# Patient Record
Sex: FEMALE
[De-identification: names, ages, dates, MRNs, and addresses within clinical notes are randomized per-mention and may not be internally consistent; named-entity substitution may affect disease eponyms.]

---

## 2019-02-27 ENCOUNTER — HOSPITAL ENCOUNTER (EMERGENCY)
Dept: HOSPITAL 88 - ER | Age: 64
Discharge: HOME | End: 2019-02-27
Payer: COMMERCIAL

## 2019-02-27 VITALS — DIASTOLIC BLOOD PRESSURE: 98 MMHG | SYSTOLIC BLOOD PRESSURE: 177 MMHG

## 2019-02-27 VITALS — BODY MASS INDEX: 35.08 KG/M2 | HEIGHT: 59 IN | WEIGHT: 174 LBS

## 2019-02-27 DIAGNOSIS — I10: ICD-10-CM

## 2019-02-27 DIAGNOSIS — E78.5: ICD-10-CM

## 2019-02-27 DIAGNOSIS — E11.9: ICD-10-CM

## 2019-02-27 DIAGNOSIS — R10.84: Primary | ICD-10-CM

## 2019-02-27 LAB
ALBUMIN SERPL-MCNC: 4.2 G/DL (ref 3.5–5)
ALBUMIN/GLOB SERPL: 1.6 {RATIO} (ref 0.8–2)
ALP SERPL-CCNC: 51 IU/L (ref 40–150)
ALT SERPL-CCNC: 23 IU/L (ref 0–55)
AMYLASE SERPL-CCNC: 62 U/L (ref 25–125)
ANION GAP SERPL CALC-SCNC: 12.6 MMOL/L (ref 8–16)
BACTERIA URNS QL MICRO: (no result) /HPF
BASOPHILS # BLD AUTO: 0 10*3/UL (ref 0–0.1)
BASOPHILS NFR BLD AUTO: 0.5 % (ref 0–1)
BILIRUB UR QL: NEGATIVE
BUN SERPL-MCNC: 21 MG/DL (ref 7–26)
BUN/CREAT SERPL: 29 (ref 6–25)
CALCIUM SERPL-MCNC: 9.8 MG/DL (ref 8.4–10.2)
CHLORIDE SERPL-SCNC: 104 MMOL/L (ref 98–107)
CLARITY UR: CLEAR
CO2 SERPL-SCNC: 23 MMOL/L (ref 22–29)
COLOR UR: YELLOW
DEPRECATED NEUTROPHILS # BLD AUTO: 3.8 10*3/UL (ref 2.1–6.9)
DEPRECATED RBC URNS MANUAL-ACNC: (no result) /HPF (ref 0–5)
EGFRCR SERPLBLD CKD-EPI 2021: > 60 ML/MIN (ref 60–?)
EOSINOPHIL # BLD AUTO: 0 10*3/UL (ref 0–0.4)
EOSINOPHIL NFR BLD AUTO: 0.2 % (ref 0–6)
EPI CELLS URNS QL MICRO: (no result) /LPF
ERYTHROCYTE [DISTWIDTH] IN CORD BLOOD: 12.4 % (ref 11.7–14.4)
GLOBULIN PLAS-MCNC: 2.7 G/DL (ref 2.3–3.5)
GLUCOSE SERPLBLD-MCNC: 149 MG/DL (ref 74–118)
HCT VFR BLD AUTO: 40 % (ref 34.2–44.1)
HGB BLD-MCNC: 13.7 G/DL (ref 12–16)
KETONES UR QL STRIP.AUTO: NEGATIVE
LEUKOCYTE ESTERASE UR QL STRIP.AUTO: NEGATIVE
LIPASE SERPL-CCNC: 44 U/L (ref 8–78)
LYMPHOCYTES # BLD: 1.8 10*3/UL (ref 1–3.2)
LYMPHOCYTES NFR BLD AUTO: 29.8 % (ref 18–39.1)
MCH RBC QN AUTO: 31.9 PG (ref 28–32)
MCHC RBC AUTO-ENTMCNC: 34.3 G/DL (ref 31–35)
MCV RBC AUTO: 93.2 FL (ref 81–99)
MONOCYTES # BLD AUTO: 0.4 10*3/UL (ref 0.2–0.8)
MONOCYTES NFR BLD AUTO: 6.1 % (ref 4.4–11.3)
NEUTS SEG NFR BLD AUTO: 62.9 % (ref 38.7–80)
NITRITE UR QL STRIP.AUTO: NEGATIVE
PLATELET # BLD AUTO: 218 X10E3/UL (ref 140–360)
POTASSIUM SERPL-SCNC: 3.6 MMOL/L (ref 3.5–5.1)
PROT UR QL STRIP.AUTO: NEGATIVE
RBC # BLD AUTO: 4.29 X10E6/UL (ref 3.6–5.1)
RENAL EPI CELLS URNS QL MICRO: (no result)
SODIUM SERPL-SCNC: 136 MMOL/L (ref 136–145)
SP GR UR STRIP: 1 (ref 1.01–1.02)
UROBILINOGEN UR STRIP-MCNC: 0.2 MG/DL (ref 0.2–1)
WBC #/AREA URNS HPF: (no result) /HPF (ref 0–5)

## 2019-02-27 PROCEDURE — 81001 URINALYSIS AUTO W/SCOPE: CPT

## 2019-02-27 PROCEDURE — 80053 COMPREHEN METABOLIC PANEL: CPT

## 2019-02-27 PROCEDURE — 99284 EMERGENCY DEPT VISIT MOD MDM: CPT

## 2019-02-27 PROCEDURE — 82150 ASSAY OF AMYLASE: CPT

## 2019-02-27 PROCEDURE — 83690 ASSAY OF LIPASE: CPT

## 2019-02-27 PROCEDURE — 74177 CT ABD & PELVIS W/CONTRAST: CPT

## 2019-02-27 PROCEDURE — 36415 COLL VENOUS BLD VENIPUNCTURE: CPT

## 2019-02-27 PROCEDURE — 85025 COMPLETE CBC W/AUTO DIFF WBC: CPT

## 2019-02-27 NOTE — DIAGNOSTIC IMAGING REPORT
EXAMINATION: CT of the abdomen and pelvis with contrast.



TECHNIQUE: 

Helical CT images of the abdomen and pelvis were performed from the lung bases

to the lesser trochanters after the intravenous administration of 150 cc of

Isovue 300 and the oral administration of none.  Coronal and sagittal

reformatted images were obtained.Dose modulation, iterative reconstruction,

and/or weight based adjustment of the mA/kV was utilized to reduce the

radiation dose to as low as reasonably achievable.



COMPARISON:  None.



CLINICAL HISTORY:Abdominal pain

     

DISCUSSION:



ABDOMEN/PELVIS:



LOWER THORAX:Unremarkable.



HEPATOBILIARY: Hepatic steatosis. No focal lesion.  No intra-or extrahepatic

biliary ductal dilation.  The gallbladder is normal.   



SPLEEN: No splenomegaly.



PANCREAS: No focal masses or ductal dilatation. 



ADRENALS: 1.2 cm right adrenal adenoma.



KIDNEYS/URETERS: No solid mass lesions. Bilateral peripelvic cysts.



PELVIC ORGANS/BLADDER: The bladder is normal.  



PERITONEUM/RETROPERITONEUM: No free air or fluid.



LYMPH NODES: No intra-abdominal, retroperitoneal, pelvic or inguinal

lymphadenopathy.



VESSELS: Mild vascular calcifications.



GI TRACT: No distention or wall thickening. Scattered diverticulosis.



BONES AND SOFT TISSUE: No bony destructive lesions.    No soft tissue

abnormalities.  



IMPRESSION: 



No acute CT finding.



Signed by: Dr. Andrew Palisch, M.D. on 2/27/2019 3:24 PM

## 2020-04-20 ENCOUNTER — HOSPITAL ENCOUNTER (EMERGENCY)
Dept: HOSPITAL 88 - FSED | Age: 65
Discharge: LEFT BEFORE DISCHARGE | End: 2020-04-20
Payer: COMMERCIAL

## 2020-04-20 DIAGNOSIS — R69: Primary | ICD-10-CM

## 2020-04-20 NOTE — XMS REPORT
Patient Summary Document

                             Created on: 2020



CHERELLE CHAPIN

External Reference #: [id number]

: 1955

Sex: Female



Demographics







                          Address                   [address] [place]  [address]

 

                          Home Phone                [phone]

 

                          Preferred Language        Unknown

 

                          Marital Status            Unknown

 

                          Yazidism Affiliation     Unknown

 

                          Race                      Unknown

 

                          Additional Race(s)        Other



 

                          Ethnic Group              Unknown





Author







                          Author                    [facility]

 

                          [facility]

 

                          Address                   Unknown

 

                          Phone                     Unavailable







Support







                Name            Relationship    Address         Phone

 

                    CADY  ANDRAE    Caregiver           [address] [place]  [address]                     [phone]

 

                    ANDRAE LAZAR    Caregiver           [address] [place]  [address]                     [phone]

 

                    CHAU SHAW MD    Caregiver           [address]

[place]  [address]                 Unavailable

 

                    DARI JAZZMINE FLORES    Next Of Kin         [address], [place]  [id number] [phone]







Care Team Providers







                    Care Team Member Name    Role                Phone

 

                    ANDRAE LAZAR    PP                  [phone]

 

                    CHAU SHAW    Unavailable         Unavailable







Payers







             Payer Name    Policy Type    Policy Number    Effective Date    Expiration Date

 

             [facility]                 [id number]    2014 00:00:00     







Problems

This patient has no known problems.



Allergies, Adverse Reactions, Alerts







          Allergy Name    Allergy Type    Status    Severity    Reaction(s)    Onset Date    Inactive 

Date                      Treating Clinician        Comments

 

             Angiotensin-converting enzyme inhibitor    Allergy to Substance    Active       Unknown      

                2019 00:00:00                                     

 

        Penicillin    Allergy to Substance    Active    Unknown            2019 00:00:00              

                                         

 

        Simvastatin    Allergy to Substance    Active    Unknown            2019 00:00:00             

                                         

 

        Atorvastatin    Allergy to Substance    Active    Unknown            2019 00:00:00            

                                         

 

        Ezetimibe    Allergy to Substance    Active    Unknown            2019 00:00:00                    

 







Medications

This patient has no known medications.



Procedures and Interventions







                    Procedure           Date / Time Performed    Performing Clinician

 

                    Computed tomography of abdomen and pelvis with contrast    2019 00:00:00    CORRINA ANGULO







Encounters







             Start Date/Time    End Date/Time    Encounter Type    Admission Type    Attending Clinicians

                    Care Facility       Care Department     Encounter ID

 

             2019 11:41:00    2019 17:07:00    Departed Emergency Room    1            JUANA SHAW

                    [facility]              [id number]







Results







           Test Description    Test Time    Test Comments    Text Results    Atomic Results    Result

 Comments

 

                CT ABDOMEN/PELVIS W    2019 15:20:00                                                       

                                                   [place] [address]      Patient Name: CHERELLE SALMON                                   MR 
#: [id number]                     : 1955                               
   Age/Sex: 63/F  Acct #: [id number]                              Req #: [id number]
[id number]  Adm Physician:                                                      
Ordered by: CORRINA ANGULO NP                            Report #: [id number]
       Location: ER                                      Room/Bed:              
      
___________________________________________________________________________________________________
   Procedure: [id number] CT/CT ABDOMEN/PELVIS W  Exam Date: 19             
              Exam Time: 1308                                              
REPORT STATUS: Signed    EXAMINATION: CT of the abdomen and pelvis with contras
t.      TECHNIQUE:    Helical CT images of the abdomen and pelvis were performed
from the lung bases   to the lesser trochanters after the intravenous 
administration of 150 cc of   Isovue 300 and the oral administration of none.  
Coronal and sagittal   reformatted images were obtained.Dose modulation, 
iterative reconstruction,   and/or weight based adjustment of the mA/kV was 
utilized to reduce the   radiation dose to as low as reasonably achievable.     
COMPARISON:  None.      CLINICAL HISTORY:Abdominal pain           DISCUSSION:   
  ABDOMEN/PELVIS:      LOWER THORAX:Unremarkable.      HEPATOBILIARY: Hepatic 
steatosis. No focal lesion.  No intra-or extrahepatic   biliary ductal dilation.
 The gallbladder is normal.         SPLEEN: No splenomegaly.      PANCREAS: No 
focal masses or ductal dilatation.       ADRENALS: 1.2 cm right adrenal adenoma.
     KIDNEYS/URETERS: No solid mass lesions. Bilateral peripelvic cysts.      
PELVIC ORGANS/BLADDER: The bladder is normal.        PERITONEUM/RETROPERITONEUM:
No free air or fluid.      LYMPH NODES: No intra-abdominal, retroperitoneal, 
pelvic or inguinal   lymphadenopathy.      VESSELS: Mild vascular 
calcifications.      GI TRACT: No distention or wall thickening. Scattered 
diverticulosis.      BONES AND SOFT TISSUE: No bony destructive lesions.    No 
soft tissue   abnormalities.        IMPRESSION:       No acute CT finding.      
Signed by: Dr. Andrew Palisch, M.D. on 2019 3:24 PM        Dictated By: 
ANDREW R PALISCH MD  Electronically Signed By: ANDREW R PALISCH MD on 19  Transcribed By: LORI on 194       COPY TO:   
CORRINA ANGULO NP                     

 

                Sodium Level    2019 13:39:00                      

 

   

 

                Sodium Level (test code=2951-2)    136             136-145          





Potassium Rykfs7642-03-87 13:39:00* 





                Test Item       Value           Reference Range    Comments

 

                Potassium Level (test code=2823-3)    3.6             3.5-5.1          





Chloride Oblmm4428-12-52 13:39:00* 





                Test Item       Value           Reference Range    Comments

 

                Chloride Level (test code=2075-0)    104                        





Carbon Dioxide Cfeqn3144-02-11 13:39:00* 





                Test Item       Value           Reference Range    Comments

 

                Carbon Dioxide Level (test code=2028-9)    23              22-29            





Anion Mnv7742-61-33 13:39:00* 





                Test Item       Value           Reference Range    Comments

 

                Anion Gap (test code=33037-3)    12.6            8-16             





Blood Urea Rgjvdbot2812-15-05 13:39:00* 





                Test Item       Value           Reference Range    Comments

 

                Blood Urea Nitrogen (test code=3094-0)    21              7-26             





Kopwzplsrm2833-58-98 13:39:00* 





                Test Item       Value           Reference Range    Comments

 

                Creatinine (test code=2160-0)    0.72            0.57-1.11        





BUN/Creatinine Cxxzz2877-35-44 13:39:00* 





                Test Item       Value           Reference Range    Comments

 

                BUN/Creatinine Ratio (test code=3097-3)    29              6-25             





Estimat Glomerular Filtration Vuwi6831-49-13 13:39:00* 





                Test Item       Value           Reference Range    Comments

 

                Estimat Glomerular Filtration Rate (test code=444336003)    > 60            >60              





Ranges were taken from the National Kidney Disease Education Program and the [employer] literature.Reference ranges:60 or greater: Nlypbb83-18 (
for 3 consecutive months): Chronic kidney disease 15 or less: Kidney failure
Glucose Nuhqr2896-52-15 13:39:00* 





                Test Item       Value           Reference Range    Comments

 

                Glucose Level (test code=IMO0002)    149                        





Calcium Rohdd9637-11-58 13:39:00* 





                Test Item       Value           Reference Range    Comments

 

                Calcium Level (test code=17861-6)    9.8             8.4-10.2         





Total Ieafztcmz5775-92-75 13:39:00* 





                Test Item       Value           Reference Range    Comments

 

                Total Bilirubin (test code=1975-2)    0.5             0.2-1.2          





Aspartate Amino Transf (AST/SGOT)2019 13:39:00* 





                Test Item       Value           Reference Range    Comments

 

                                        Aspartate Amino Transf (AST/SGOT) (test code=Aspartate Amino Transf (AST/SGOT)) 

                    25                  5-34                 





Alanine Aminotransferase (ALT/SGPT)2019 13:39:00* 





                Test Item       Value           Reference Range    Comments

 

                Alanine Aminotransferase (ALT/SGPT) (test code=1742-6)    23              0-55             





Total Umulpwg6716-20-48 13:39:00* 





                Test Item       Value           Reference Range    Comments

 

                Total Protein (test code=2885-2)    6.9             6.5-8.1          





Derxcjx8138-17-50 13:39:00* 





                Test Item       Value           Reference Range    Comments

 

                Albumin (test code=1751-7)    4.2             3.5-5.0          





Tbpmwtnk6550-77-93 13:39:00* 





                Test Item       Value           Reference Range    Comments

 

                Globulin (test code=13536-8)    2.7             2.3-3.5          





Albumin/Globulin Yzgvz4989-29-42 13:39:00* 





                Test Item       Value           Reference Range    Comments

 

                Albumin/Globulin Ratio (test code=1759-0)    1.6             0.8-2.0          





Alkaline Ynarjgqrytq4440-82-81 13:39:00* 





                Test Item       Value           Reference Range    Comments

 

                Alkaline Phosphatase (test code=6768-6)    51                         





Amylase Npois7578-33-81 13:39:00* 





                Test Item       Value           Reference Range    Comments

 

                Amylase Level (test code=1798-8)    62                         





Eoymlc1885-24-48 13:39:00* 





                Test Item       Value           Reference Range    Comments

 

                Lipase (test code=3040-3)    44              8-78             





Urine YZQ1944-11-24 13:30:00* 





                Test Item       Value           Reference Range    Comments

 

                Urine WBC (test code=5821-4)    NONE            0-5              





Urine LMN9702-08-16 13:30:00* 





                Test Item       Value           Reference Range    Comments

 

                Urine RBC (test code=32776-7)    NONE            0-5              





Urine Tquxtcxv6600-41-23 13:30:00* 





                Test Item       Value           Reference Range    Comments

 

                Urine Bacteria (test code=25145-4)    FEW             NONE             





Urine Epithelial Jckja1624-60-26 13:30:00* 





                Test Item       Value           Reference Range    Comments

 

                Urine Epithelial Cells (test code=20453-7)    FEW             NONE             





Urine Renal Epithelial Lwntc7309-72-77 13:30:00* 





                Test Item       Value           Reference Range    Comments

 

                Urine Renal Epithelial Cells (test code=12248-1)    RARE            NONE             





Urine Vpsmi6479-04-11 13:22:00* 





                Test Item       Value           Reference Range    Comments

 

                Urine Color (test code=5778-6)    YELLOW          YELLOW           





Urine Xrszmfm8669-35-55 13:22:00* 





                Test Item       Value           Reference Range    Comments

 

                Urine Clarity (test code=32167-9)    CLEAR           CLEAR            





Urine Specific Qdzngwu4188-68-17 13:22:00* 





                Test Item       Value           Reference Range    Comments

 

                Urine Specific Gravity (test code=5811-5)    1.005           1.010-1.025      





Urine sM3391-28-26 13:22:00* 





                Test Item       Value           Reference Range    Comments

 

                Urine pH (test code=50560-2)    7               5-7              





Urine Leukocyte Ajkbazny9229-97-58 13:22:00* 





                Test Item       Value           Reference Range    Comments

 

                Urine Leukocyte Esterase (test code=5799-2)    NEGATIVE        NEGATIVE         





Urine Jfbytjm5728-00-31 13:22:00* 





                Test Item       Value           Reference Range    Comments

 

                Urine Nitrite (test code=32710-6)    NEGATIVE        NEGATIVE         





Urine Yophbqx2063-97-17 13:22:00* 





                Test Item       Value           Reference Range    Comments

 

                Urine Protein (test code=5804-0)    NEGATIVE        NEGATIVE         





Urine Glucose (UA)2019 13:22:00* 





                Test Item       Value           Reference Range    Comments

 

                Urine Glucose (UA) (test code=2349-9)    NEGATIVE        NEGATIVE         





Urine Jicwvua9079-37-15 13:22:00* 





                Test Item       Value           Reference Range    Comments

 

                Urine Ketones (test code=57734-6)    NEGATIVE        NEGATIVE         





Urine Yzseyillbxjo0681-79-27 13:22:00* 





                Test Item       Value           Reference Range    Comments

 

                Urine Urobilinogen (test code=20405-7)    0.2             0.2-1            





Urine Oaisvfngg0848-41-54 13:22:00* 





                Test Item       Value           Reference Range    Comments

 

                Urine Bilirubin (test code=1978-6)    NEGATIVE        NEGATIVE         





Urine Qqorb2410-47-16 13:22:00* 





                Test Item       Value           Reference Range    Comments

 

                Urine Blood (test code=33051-4)    NEGATIVE        NEGATIVE         





White Blood Ubewj9860-01-42 13:17:00* 





                Test Item       Value           Reference Range    Comments

 

                White Blood Count (test code=6690-2)    6.05            4.8-10.8         





Red Blood Mpouu4936-53-49 13:17:00* 





                Test Item       Value           Reference Range    Comments

 

                Red Blood Count (test code=789-8)    4.29            3.6-5.1          





Ejtwaokisu0668-85-40 13:17:00* 





                Test Item       Value           Reference Range    Comments

 

                Hemoglobin (test code=59260-0)    13.7            12.0-16.0        





Mjefvurqqf2126-21-78 13:17:00* 





                Test Item       Value           Reference Range    Comments

 

                Hematocrit (test code=4544-3)    40.0            34.2-44.1        





Mean Corpuscular Kqoiqj3750-88-31 13:17:00* 





                Test Item       Value           Reference Range    Comments

 

                Mean Corpuscular Volume (test code=787-2)    93.2            81-99            





Mean Corpuscular Iadouwqrqm7548-60-95 13:17:00* 





                Test Item       Value           Reference Range    Comments

 

                Mean Corpuscular Hemoglobin (test code=785-6)    31.9            28-32            





Mean Corpuscular Hemoglobin Xbgjafv6446-82-46 13:17:00* 





                Test Item       Value           Reference Range    Comments

 

                Mean Corpuscular Hemoglobin Concent (test code=786-4)    34.3            31-35            





Red Cell Distribution Tzzaf2855-77-36 13:17:00* 





                Test Item       Value           Reference Range    Comments

 

                Red Cell Distribution Width (test code=47277-9)    12.4            11.7-14.4        





Platelet Tqadw2398-12-58 13:17:00* 





                Test Item       Value           Reference Range    Comments

 

                Platelet Count (test code=777-3)    218             140-360          





Neutrophils (%) (Auto)2019 13:17:00* 





                Test Item       Value           Reference Range    Comments

 

                Neutrophils (%) (Auto) (test code=32200-8)    62.9            38.7-80.0        





Lymphocytes (%) (Auto)2019 13:17:00* 





                Test Item       Value           Reference Range    Comments

 

                Lymphocytes (%) (Auto) (test code=736-9)    29.8            18.0-39.1        





Monocytes (%) (Auto)2019 13:17:00* 





                Test Item       Value           Reference Range    Comments

 

                Monocytes (%) (Auto) (test code=5905-5)    6.1             4.4-11.3         





Eosinophils (%) (Auto)2019 13:17:00* 





                Test Item       Value           Reference Range    Comments

 

                Eosinophils (%) (Auto) (test code=713-8)    0.2             0.0-6.0          





Basophils (%) (Auto)2019 13:17:00* 





                Test Item       Value           Reference Range    Comments

 

                Basophils (%) (Auto) (test code=706-2)    0.5             0.0-1.0          





IM GRANULOCYTES %2019 13:17:00* 





                Test Item       Value           Reference Range    Comments

 

                IM GRANULOCYTES % (test code=IM GRANULOCYTES %)    0.5             0.0-1.0          





Neutrophils # (Auto)2019 13:17:00* 





                Test Item       Value           Reference Range    Comments

 

                Neutrophils # (Auto) (test code=751-8)    3.8             2.1-6.9          





Lymphocytes # (Auto)2019 13:17:00* 





                Test Item       Value           Reference Range    Comments

 

                Lymphocytes # (Auto) (test code=26474-7)    1.8             1.0-3.2          





Monocytes # (Auto)2019 13:17:00* 





                Test Item       Value           Reference Range    Comments

 

                Monocytes # (Auto) (test code=742-7)    0.4             0.2-0.8          





Eosinophils # (Auto)2019 13:17:00* 





                Test Item       Value           Reference Range    Comments

 

                Eosinophils # (Auto) (test code=711-2)    0.0             0.0-0.4          





Basophils # (Auto)2019 13:17:00* 





                Test Item       Value           Reference Range    Comments

 

                Basophils # (Auto) (test code=704-7)    0.0             0.0-0.1          





Absolute Immature Granulocyte (wpnc2928-15-12 13:17:00* 





                Test Item       Value           Reference Range    Comments

 

                                        Absolute Immature Granulocyte (auto (test code=Absolute Immature Granulocyte (auto)

                    0.03                0-0.1

## 2023-05-19 ENCOUNTER — HOSPITAL ENCOUNTER (EMERGENCY)
Dept: HOSPITAL 88 - ER | Age: 68
Discharge: HOME | End: 2023-05-19
Payer: MEDICARE

## 2023-05-19 VITALS — WEIGHT: 174 LBS | HEIGHT: 59 IN | BODY MASS INDEX: 35.08 KG/M2

## 2023-05-19 VITALS — HEART RATE: 88 BPM | SYSTOLIC BLOOD PRESSURE: 132 MMHG | RESPIRATION RATE: 16 BRPM | DIASTOLIC BLOOD PRESSURE: 68 MMHG

## 2023-05-19 VITALS — OXYGEN SATURATION: 100 %

## 2023-05-19 DIAGNOSIS — I10: ICD-10-CM

## 2023-05-19 DIAGNOSIS — E11.65: ICD-10-CM

## 2023-05-19 DIAGNOSIS — E78.5: ICD-10-CM

## 2023-05-19 DIAGNOSIS — R42: Primary | ICD-10-CM

## 2023-05-19 LAB
ALBUMIN SERPL-MCNC: 4 G/DL (ref 3.5–5)
ALBUMIN/GLOB SERPL: 1.4 {RATIO} (ref 0.8–2)
ALP SERPL-CCNC: 45 IU/L (ref 40–150)
ALT SERPL-CCNC: 18 IU/L (ref 0–55)
ANION GAP SERPL CALC-SCNC: 14.7 MMOL/L (ref 8–16)
BACTERIA URNS QL MICRO: (no result) /HPF
BASOPHILS # BLD AUTO: 0.1 10*3/UL (ref 0–0.1)
BASOPHILS NFR BLD AUTO: 0.8 % (ref 0–1)
BUN SERPL-MCNC: 24 MG/DL (ref 7–26)
BUN/CREAT SERPL: 30 (ref 6–25)
CALCIUM SERPL-MCNC: 9.2 MG/DL (ref 8.4–10.2)
CHLORIDE SERPL-SCNC: 105 MMOL/L (ref 98–107)
CLARITY UR: (no result)
CO2 SERPL-SCNC: 22 MMOL/L (ref 22–29)
COLOR UR: YELLOW
DEPRECATED NEUTROPHILS # BLD AUTO: 4.8 10*3/UL (ref 2.1–6.9)
DEPRECATED RBC URNS MANUAL-ACNC: (no result) /HPF (ref 0–5)
EOSINOPHIL # BLD AUTO: 0.1 10*3/UL (ref 0–0.4)
EOSINOPHIL NFR BLD AUTO: 1.3 % (ref 0–6)
EPI CELLS URNS QL MICRO: (no result) /LPF
ERYTHROCYTE [DISTWIDTH] IN CORD BLOOD: 12.6 % (ref 11.7–14.4)
GLOBULIN PLAS-MCNC: 2.9 G/DL (ref 2.3–3.5)
GLUCOSE SERPLBLD-MCNC: 291 MG/DL (ref 74–118)
HCT VFR BLD AUTO: 43.6 % (ref 34.2–44.1)
HGB BLD-MCNC: 14.5 G/DL (ref 12–16)
KETONES UR QL STRIP.AUTO: NEGATIVE
LEUKOCYTE ESTERASE UR QL STRIP.AUTO: NEGATIVE
LYMPHOCYTES # BLD: 1.6 10*3/UL (ref 1–3.2)
LYMPHOCYTES NFR BLD AUTO: 22.8 % (ref 18–39.1)
MCH RBC QN AUTO: 31.9 PG (ref 28–32)
MCHC RBC AUTO-ENTMCNC: 33.3 G/DL (ref 31–35)
MCV RBC AUTO: 96 FL (ref 81–99)
MONOCYTES # BLD AUTO: 0.5 10*3/UL (ref 0.2–0.8)
MONOCYTES NFR BLD AUTO: 6.7 % (ref 4.4–11.3)
NEUTS SEG NFR BLD AUTO: 67.3 % (ref 38.7–80)
NITRITE UR QL STRIP.AUTO: NEGATIVE
PLATELET # BLD AUTO: 211 X10E3/UL (ref 140–360)
POTASSIUM SERPL-SCNC: 3.7 MMOL/L (ref 3.5–5.1)
PROT UR QL STRIP.AUTO: NEGATIVE
RBC # BLD AUTO: 4.54 X10E6/UL (ref 3.6–5.1)
SODIUM SERPL-SCNC: 138 MMOL/L (ref 136–145)
SP GR UR STRIP: 1.02 (ref 1.01–1.02)
UROBILINOGEN UR STRIP-MCNC: 0.2 MG/DL (ref 0.2–1)
WBC #/AREA URNS HPF: (no result) /HPF (ref 0–5)

## 2023-05-19 PROCEDURE — 93005 ELECTROCARDIOGRAM TRACING: CPT

## 2023-05-19 PROCEDURE — 36415 COLL VENOUS BLD VENIPUNCTURE: CPT

## 2023-05-19 PROCEDURE — 99284 EMERGENCY DEPT VISIT MOD MDM: CPT

## 2023-05-19 PROCEDURE — 71045 X-RAY EXAM CHEST 1 VIEW: CPT

## 2023-05-19 PROCEDURE — 85025 COMPLETE CBC W/AUTO DIFF WBC: CPT

## 2023-05-19 PROCEDURE — 70450 CT HEAD/BRAIN W/O DYE: CPT

## 2023-05-19 PROCEDURE — 81001 URINALYSIS AUTO W/SCOPE: CPT

## 2023-05-19 PROCEDURE — 84484 ASSAY OF TROPONIN QUANT: CPT

## 2023-05-19 PROCEDURE — 80053 COMPREHEN METABOLIC PANEL: CPT

## 2025-04-04 ENCOUNTER — HOSPITAL ENCOUNTER (EMERGENCY)
Dept: HOSPITAL 88 - ER | Age: 70
Discharge: HOME | End: 2025-04-04
Payer: MEDICARE

## 2025-04-04 VITALS — WEIGHT: 170 LBS | HEIGHT: 59 IN | BODY MASS INDEX: 34.27 KG/M2

## 2025-04-04 VITALS — HEART RATE: 79 BPM | OXYGEN SATURATION: 100 % | TEMPERATURE: 97.9 F

## 2025-04-04 DIAGNOSIS — M81.0: ICD-10-CM

## 2025-04-04 DIAGNOSIS — E78.5: ICD-10-CM

## 2025-04-04 DIAGNOSIS — E11.9: ICD-10-CM

## 2025-04-04 DIAGNOSIS — I10: ICD-10-CM

## 2025-04-04 DIAGNOSIS — E78.00: ICD-10-CM

## 2025-04-04 DIAGNOSIS — M54.32: Primary | ICD-10-CM

## 2025-04-04 PROCEDURE — 99282 EMERGENCY DEPT VISIT SF MDM: CPT

## 2025-04-04 RX ADMIN — CYCLOBENZAPRINE HYDROCHLORIDE ONE MG: 10 TABLET, FILM COATED ORAL at 19:46

## 2025-04-04 RX ADMIN — KETOROLAC TROMETHAMINE ONE MG: 30 INJECTION, SOLUTION INTRAMUSCULAR at 19:53
